# Patient Record
Sex: FEMALE | Race: WHITE | HISPANIC OR LATINO | Employment: FULL TIME | URBAN - METROPOLITAN AREA
[De-identification: names, ages, dates, MRNs, and addresses within clinical notes are randomized per-mention and may not be internally consistent; named-entity substitution may affect disease eponyms.]

---

## 2019-09-25 ENCOUNTER — APPOINTMENT (OUTPATIENT)
Dept: RADIOLOGY | Facility: MEDICAL CENTER | Age: 24
End: 2019-09-25
Attending: EMERGENCY MEDICINE

## 2019-09-25 ENCOUNTER — HOSPITAL ENCOUNTER (EMERGENCY)
Facility: MEDICAL CENTER | Age: 24
End: 2019-09-25
Attending: EMERGENCY MEDICINE

## 2019-09-25 VITALS
BODY MASS INDEX: 18.83 KG/M2 | DIASTOLIC BLOOD PRESSURE: 67 MMHG | RESPIRATION RATE: 18 BRPM | WEIGHT: 120 LBS | HEIGHT: 67 IN | TEMPERATURE: 99 F | SYSTOLIC BLOOD PRESSURE: 108 MMHG | HEART RATE: 78 BPM | OXYGEN SATURATION: 100 %

## 2019-09-25 DIAGNOSIS — S83.005A PATELLAR DISLOCATION, LEFT, INITIAL ENCOUNTER: ICD-10-CM

## 2019-09-25 PROCEDURE — 96374 THER/PROPH/DIAG INJ IV PUSH: CPT

## 2019-09-25 PROCEDURE — 36415 COLL VENOUS BLD VENIPUNCTURE: CPT

## 2019-09-25 PROCEDURE — 700111 HCHG RX REV CODE 636 W/ 250 OVERRIDE (IP)

## 2019-09-25 PROCEDURE — 99285 EMERGENCY DEPT VISIT HI MDM: CPT

## 2019-09-25 PROCEDURE — 27550 TREAT KNEE DISLOCATION: CPT

## 2019-09-25 RX ADMIN — FENTANYL CITRATE 50 MCG: 50 INJECTION, SOLUTION INTRAMUSCULAR; INTRAVENOUS at 14:45

## 2019-09-25 SDOH — HEALTH STABILITY: MENTAL HEALTH: HOW OFTEN DO YOU HAVE A DRINK CONTAINING ALCOHOL?: NEVER

## 2019-09-25 ASSESSMENT — LIFESTYLE VARIABLES: DO YOU DRINK ALCOHOL: NO

## 2019-09-25 NOTE — ED PROVIDER NOTES
ED Provider Note    CHIEF COMPLAINT  Chief Complaint   Patient presents with   • Knee Trauma       HPI  Madison Pena is a 23 y.o. female who presents for evaluation of acute left knee injury.  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  She reports that she was wrestling a roommate and felt her left knee pop.  She noticed that her patella was completely laterally subluxed.  Paramedics arrived administered IV fentanyl and Versed.  She denied any numbness weakness or tingling no other injuries reported.  This is never happened on the left side    REVIEW OF SYSTEMS  See HPI for further details.  No numbness weakness or tingling all other systems are negative.     PAST MEDICAL HISTORY  No past medical history on file.  History of recurrent patellar subluxation  FAMILY HISTORY  Noncontributory    SOCIAL HISTORY  Social History     Socioeconomic History   • Marital status: Not on file     Spouse name: Not on file   • Number of children: Not on file   • Years of education: Not on file   • Highest education level: Not on file   Occupational History   • Not on file   Social Needs   • Financial resource strain: Not on file   • Food insecurity:     Worry: Not on file     Inability: Not on file   • Transportation needs:     Medical: Not on file     Non-medical: Not on file   Tobacco Use   • Smoking status: Never Smoker   • Smokeless tobacco: Never Used   Substance and Sexual Activity   • Alcohol use: Never     Frequency: Never   • Drug use: Yes     Types: Inhaled     Comment: THC use daily   • Sexual activity: Not on file   Lifestyle   • Physical activity:     Days per week: Not on file     Minutes per session: Not on file   • Stress: Not on file   Relationships   • Social connections:     Talks on phone: Not on file     Gets together: Not on file     Attends [employer] service: Not on file     Active member of club or organization: Not on file     Attends meetings of clubs or  "organizations: Not on file     Relationship status: Not on file   • Intimate partner violence:     Fear of current or ex partner: Not on file     Emotionally abused: Not on file     Physically abused: Not on file     Forced sexual activity: Not on file   Other Topics Concern   • Not on file   Social History Narrative   • Not on file     Denies IV drug  SURGICAL HISTORY  No past surgical history on file.  None reported  CURRENT MEDICATIONS  Home Medications    **Home medications have not yet been reviewed for this encounter**         ALLERGIES  No Known Allergies    PHYSICAL EXAM  VITAL SIGNS: /52   Pulse 89   Temp 37.2 °C (99 °F) (Temporal)   Resp (!) 22   Ht 1.702 m (5' 7\")   Wt 54.4 kg (120 lb)   SpO2 93%   BMI 18.79 kg/m²  Room air O2: 99    Constitutional: Well developed, Well nourished, No acute distress, Non-toxic appearance.   HENT: Normocephalic, Atraumatic, Bilateral external ears normal, Oropharynx moist, No oral exudates, Nose normal.   Eyes: PERRLA, EOMI, Conjunctiva normal, No discharge.   Neck: Normal range of motion, No tenderness, Supple, No stridor.   Cardiovascular: Normal heart rate, Normal rhythm, No murmurs, No rubs, No gallops.   Thorax & Lungs: Normal breath sounds, No respiratory distress, No wheezing, No chest tenderness.   Extremities: Left lower extremity exam is notable for exquisite tenderness noted over the left lateral patella that is complete subluxed laterally.  Knee is otherwise stable distal neurovascular exam is normal   musculoskeletal: Good range of motion in all major joints. No tenderness to palpation or major deformities noted.   Neurologic: Alert & oriented x 3, Normal motor function, Normal sensory function, No focal deficits noted.   Psychiatric: Affect normal, Judgment normal, Mood normal.     Physician procedure: Closed reduction of left-sided patellar subluxation.  Patient has had this procedure done several times before.  Verbal consent was obtained.  She " already received 150 mg of fentanyl and 2 mg of Versed prior to arrival by the paramedics.  She is placed on cardiac monitor.  She was given an additional 50 mcg of IV fentanyl and using extension and direct manipulation the patella was reduced on the first attempt no complications  COURSE & MEDICAL DECISION MAKING  Pertinent Labs & Imaging studies reviewed. (See chart for details)  Patient ultimately refused x-ray.  She has had patellar subluxation on several occasions and she reports that the x-ray result is negative.  I counseled her that there could be an avulsion fracture.  She is signed AGAINST MEDICAL ADVICE for postreduction imaging.  We will place her in a knee immobilizer.    FINAL IMPRESSION  1.  Acute left patellar dislocation/subluxation  2.  Patient left before visit complete refusing x-rays         Electronically signed by: Mikael Hopkins, 9/25/2019 2:54 PM

## 2019-09-25 NOTE — ED NOTES
Pt discharged home. Explained discharge instructions. Questions and comments addressed. Pt verbalized understanding of instructions. Wristband removed. Pt advised to follow-up with ortho or return to ED for any new or worsening of symptoms. Pt is ambulating well and steady on feet. VS stable. partner will be escorting pt home.     PIV removed and dressing applied  Pt verbalized understanding of all dc instructions.

## 2019-09-25 NOTE — ED TRIAGE NOTES
Pt bib remsa. Was wrestling with roommate when she felt her L knee pop. + deformity noted to patella. Hx of patellar dislocation x3.  Pt in 11/10 pain now.     Ems gave:   150 fentanyl  2mg versed

## 2020-06-04 ENCOUNTER — HOSPITAL ENCOUNTER (OUTPATIENT)
Facility: MEDICAL CENTER | Age: 25
End: 2020-06-04
Attending: PHYSICIAN ASSISTANT
Payer: COMMERCIAL

## 2020-06-04 PROCEDURE — 87591 N.GONORRHOEAE DNA AMP PROB: CPT

## 2020-06-04 PROCEDURE — 87491 CHLMYD TRACH DNA AMP PROBE: CPT

## 2020-06-04 PROCEDURE — 88175 CYTOPATH C/V AUTO FLUID REDO: CPT

## 2020-06-08 LAB
C TRACH DNA GENITAL QL NAA+PROBE: NEGATIVE
CYTOLOGY REG CYTOL: NORMAL
N GONORRHOEA DNA GENITAL QL NAA+PROBE: NEGATIVE
SPECIMEN SOURCE: NORMAL